# Patient Record
Sex: MALE | Race: WHITE | Employment: FULL TIME | URBAN - METROPOLITAN AREA
[De-identification: names, ages, dates, MRNs, and addresses within clinical notes are randomized per-mention and may not be internally consistent; named-entity substitution may affect disease eponyms.]

---

## 2024-08-12 ENCOUNTER — OFFICE VISIT (OUTPATIENT)
Age: 44
End: 2024-08-12

## 2024-08-12 VITALS
BODY MASS INDEX: 24.25 KG/M2 | HEIGHT: 73 IN | SYSTOLIC BLOOD PRESSURE: 106 MMHG | TEMPERATURE: 100.6 F | RESPIRATION RATE: 20 BRPM | OXYGEN SATURATION: 97 % | DIASTOLIC BLOOD PRESSURE: 75 MMHG | HEART RATE: 103 BPM | WEIGHT: 183 LBS

## 2024-08-12 DIAGNOSIS — U07.1 COVID: Primary | ICD-10-CM

## 2024-08-12 NOTE — PROGRESS NOTES
Rate and Rhythm: Regular rhythm. Tachycardia present.      Heart sounds: No murmur heard.     No friction rub. No gallop.   Pulmonary:      Effort: Pulmonary effort is normal. No respiratory distress.      Breath sounds: Normal breath sounds. No stridor. No wheezing, rhonchi or rales.   Chest:      Chest wall: No tenderness.   Abdominal:      General: Abdomen is flat. Bowel sounds are normal.      Palpations: Abdomen is soft.   Musculoskeletal:         General: Normal range of motion.      Cervical back: Normal range of motion.   Lymphadenopathy:      Cervical: No cervical adenopathy.   Skin:     General: Skin is warm.      Findings: No rash.   Neurological:      General: No focal deficit present.      Mental Status: He is alert and oriented to person, place, and time. Mental status is at baseline.   Psychiatric:         Mood and Affect: Mood normal.         Behavior: Behavior normal.         Thought Content: Thought content normal.         Judgment: Judgment normal.         We discussed when to utilize emergency services. Patient verbalized understanding.    An electronic signature was used to authenticate this note.    YULIANA Rivas - CNP

## 2024-08-13 DIAGNOSIS — U07.1 COVID: ICD-10-CM

## 2024-08-13 NOTE — PATIENT INSTRUCTIONS
Follow up if symptoms persist or if symptoms worsen please present to the ED.    Indications for hospitalization - There are no fixed criteria for inpatient hospital admission with COVID-19; however, hospitalization is warranted for most patients with any of the following:  -An oxygen saturation of <94 percent on room air  -Respiratory rate of >30 breaths/minute  -PaO2/FiO2 <300 mmHg  -Lung infiltrates >50 percent     Take the paxlovid as directed.  Monitor HR and oxygen saturation.  Call or return to clinic if no improvement or any worsening  Patient comfortable with plan